# Patient Record
Sex: FEMALE | Race: OTHER
[De-identification: names, ages, dates, MRNs, and addresses within clinical notes are randomized per-mention and may not be internally consistent; named-entity substitution may affect disease eponyms.]

---

## 2018-05-30 ENCOUNTER — HOSPITAL ENCOUNTER (INPATIENT)
Dept: HOSPITAL 72 - EMR | Age: 28
LOS: 2 days | Discharge: HOME HEALTH | DRG: 552 | End: 2018-06-01
Payer: SELF-PAY

## 2018-05-30 VITALS — DIASTOLIC BLOOD PRESSURE: 76 MMHG | SYSTOLIC BLOOD PRESSURE: 132 MMHG

## 2018-05-30 VITALS — WEIGHT: 198 LBS | HEIGHT: 61 IN | BODY MASS INDEX: 37.38 KG/M2

## 2018-05-30 VITALS — DIASTOLIC BLOOD PRESSURE: 70 MMHG | SYSTOLIC BLOOD PRESSURE: 127 MMHG

## 2018-05-30 VITALS — SYSTOLIC BLOOD PRESSURE: 128 MMHG | DIASTOLIC BLOOD PRESSURE: 80 MMHG

## 2018-05-30 VITALS — DIASTOLIC BLOOD PRESSURE: 83 MMHG | SYSTOLIC BLOOD PRESSURE: 138 MMHG

## 2018-05-30 DIAGNOSIS — M51.26: Primary | ICD-10-CM

## 2018-05-30 DIAGNOSIS — R74.0: ICD-10-CM

## 2018-05-30 DIAGNOSIS — M43.06: ICD-10-CM

## 2018-05-30 LAB
ADD MANUAL DIFF: NO
ALBUMIN SERPL-MCNC: 3.8 G/DL (ref 3.4–5)
ALBUMIN/GLOB SERPL: 1 {RATIO} (ref 1–2.7)
ALP SERPL-CCNC: 105 U/L (ref 46–116)
ALT SERPL-CCNC: 107 U/L (ref 12–78)
ANION GAP SERPL CALC-SCNC: 10 MMOL/L (ref 5–15)
APPEARANCE UR: CLEAR
APTT PPP: (no result) S
AST SERPL-CCNC: 159 U/L (ref 15–37)
BASOPHILS NFR BLD AUTO: 0.7 % (ref 0–2)
BILIRUB SERPL-MCNC: 0.4 MG/DL (ref 0.2–1)
BUN SERPL-MCNC: 11 MG/DL (ref 7–18)
CALCIUM SERPL-MCNC: 8.7 MG/DL (ref 8.5–10.1)
CHLORIDE SERPL-SCNC: 106 MMOL/L (ref 98–107)
CO2 SERPL-SCNC: 24 MMOL/L (ref 21–32)
CREAT SERPL-MCNC: 0.7 MG/DL (ref 0.55–1.3)
EOSINOPHIL NFR BLD AUTO: 1.1 % (ref 0–3)
ERYTHROCYTE [DISTWIDTH] IN BLOOD BY AUTOMATED COUNT: 10.9 % (ref 11.6–14.8)
GLOBULIN SER-MCNC: 3.7 G/DL
GLUCOSE UR STRIP-MCNC: NEGATIVE MG/DL
HCT VFR BLD CALC: 39.6 % (ref 37–47)
HGB BLD-MCNC: 14 G/DL (ref 12–16)
KETONES UR QL STRIP: NEGATIVE
LEUKOCYTE ESTERASE UR QL STRIP: NEGATIVE
LYMPHOCYTES NFR BLD AUTO: 18.3 % (ref 20–45)
MCV RBC AUTO: 90 FL (ref 80–99)
MONOCYTES NFR BLD AUTO: 4.8 % (ref 1–10)
NEUTROPHILS NFR BLD AUTO: 75.1 % (ref 45–75)
NITRITE UR QL STRIP: NEGATIVE
PH UR STRIP: 7 [PH] (ref 4.5–8)
PLATELET # BLD: 281 K/UL (ref 150–450)
POTASSIUM SERPL-SCNC: 3.7 MMOL/L (ref 3.5–5.1)
PROT UR QL STRIP: NEGATIVE
RBC # BLD AUTO: 4.39 M/UL (ref 4.2–5.4)
SODIUM SERPL-SCNC: 140 MMOL/L (ref 136–145)
SP GR UR STRIP: 1 (ref 1–1.03)
UROBILINOGEN UR-MCNC: NORMAL MG/DL (ref 0–1)
WBC # BLD AUTO: 10.5 K/UL (ref 4.8–10.8)

## 2018-05-30 PROCEDURE — 36415 COLL VENOUS BLD VENIPUNCTURE: CPT

## 2018-05-30 PROCEDURE — 80329 ANALGESICS NON-OPIOID 1 OR 2: CPT

## 2018-05-30 PROCEDURE — 81003 URINALYSIS AUTO W/O SCOPE: CPT

## 2018-05-30 PROCEDURE — 87340 HEPATITIS B SURFACE AG IA: CPT

## 2018-05-30 PROCEDURE — 99284 EMERGENCY DEPT VISIT MOD MDM: CPT

## 2018-05-30 PROCEDURE — 85025 COMPLETE CBC W/AUTO DIFF WBC: CPT

## 2018-05-30 PROCEDURE — 81025 URINE PREGNANCY TEST: CPT

## 2018-05-30 PROCEDURE — 80048 BASIC METABOLIC PNL TOTAL CA: CPT

## 2018-05-30 PROCEDURE — 72148 MRI LUMBAR SPINE W/O DYE: CPT

## 2018-05-30 PROCEDURE — 86705 HEP B CORE ANTIBODY IGM: CPT

## 2018-05-30 PROCEDURE — 86803 HEPATITIS C AB TEST: CPT

## 2018-05-30 PROCEDURE — 72131 CT LUMBAR SPINE W/O DYE: CPT

## 2018-05-30 PROCEDURE — 86709 HEPATITIS A IGM ANTIBODY: CPT

## 2018-05-30 PROCEDURE — 80053 COMPREHEN METABOLIC PANEL: CPT

## 2018-05-30 NOTE — EMERGENCY ROOM REPORT
History of Present Illness


General


Chief Complaint:  Lower Back Pain or Injury


Source:  Patient, EMS





Present Illness


HPI


28-year-old female presents emergency department complaining of 10 out of 10 in 

severity acute onset sharp shooting lower back pain when she sat down to use 

the restroom.  Patient states that for the prior 2 days she has been having 

dull low back pain but no acute exacerbations of her pain similar in character 

to what she is experiencing right now.  Patient also reports urinary urgency, 

denies hematuria she denies fevers or chills, nausea or vomiting, constipation/

diarrhea or abdominal tenderness. Pain is currently exacerbated with any slight 

movement.  She denies history of sciatica she states that she has 

intermittently had low back pain before but it was always very mild in nature 

and easily relieved with over-the-counter pain medications.  Patient denies 

trauma or fall. Denies numbness tingling or loss of sensation or gross motor 

movements of the extremities, incontinence of bowel or bladder. Denies CP, 

Palpitations, LOC, AMS, dizziness, Changes in Vision, paresthesias, or a sudden 

severe headache.


Allergies:  


Coded Allergies:  


     No Known Allergies (Verified , 5/22/08)





Patient History


Past Medical History:  see triage record


Past Surgical History:  none


Pertinent Family History:  none


Last Menstrual Period:  05/28/2018


Pregnant Now:  No


Reviewed Nursing Documentation:  PMH: Agreed; PSxH: Agreed





Nursing Documentation-PMH


Past Medical History:  No Stated History





Review of Systems


All Other Systems:  negative except mentioned in HPI





Physical Exam





Vital Signs








  Date Time  Temp Pulse Resp B/P (MAP) Pulse Ox O2 Delivery O2 Flow Rate FiO2


 


5/30/18 17:13 97.8 85 19 138/83 99 Room Air  





 97.9       








Sp02 EP Interpretation:  reviewed, normal


General Appearance:  alert, GCS 15, non-toxic, moderate distress


Head:  normocephalic, atraumatic


ENT:  hearing grossly normal, normal voice


Neck:  full range of motion


Respiratory:  chest non-tender, lungs clear, normal breath sounds, speaking 

full sentences


Cardiovascular #1:  regular rate, rhythm, no edema, normal capillary refill


Cardiovascular #2:  2+ dorsalis pedis (R), 2+ dorsalis pedis (L)


Gastrointestinal:  normal bowel sounds, non tender, soft


Rectal:  deferred


Genitourinary:  normal inspection


Musculoskeletal:  back normal, other - Unable to ambulate due to pain, pt. 

cannot perform straight leg raise.  significant pain with the slightest of 

movements. , tender - moderate TTP across the lumbar area both paraspinal and 

midline.


Neurologic:  alert, oriented x3, responsive, sensory intact, speech normal, 

other - evaluation limited due to pt. having pain, grossly normal


Psychiatric:  judgement/insight normal


Skin:  normal color, no rash, warm/dry, well hydrated





Medical Decision Making


PA Attestation


Dr. garcia is my supervising Physician whom patient management has been 

discussed with.


Diagnostic Impression:  


 Primary Impression:  


 Acute low back pain


 Qualified Codes:  M54.5 - Low back pain


 Additional Impression:  


 Spondylolysis of lumbar region


ER Course


28-year-old female presents emergency department complaining of 10 out of 10 in 

severity acute onset sharp shooting lower back pain when she sat down to use 

the restroom.  Patient states that for the prior 2 days she has been having 

dull low back pain but no acute exacerbations of her pain similar in character 

to what she is experiencing right now.  Patient also reports urinary urgency, 

denies hematuria she denies fevers or chills, nausea or vomiting, constipation/

diarrhea or abdominal tenderness. Pain is currently exacerbated with any slight 

movement.  She denies history of sciatica she states that she has 

intermittently had low back pain before but it was always very mild in nature 

and easily relieved with over-the-counter pain medications.  Patient denies 

trauma or fall. Denies numbness tingling or loss of sensation or gross motor 

movements of the extremities, incontinence of bowel or bladder. Denies CP, 

Palpitations, LOC, AMS, dizziness, Changes in Vision, paresthesias, or a sudden 

severe headache. 





Ddx considered but are not limited to Sciatica, muscle strain/spasm, 

Pyelonephritis, UTI, kidney stone, herniated disc just to name a few. 





Vital signs: are WNL, pt. is afebrile





H&PE are most consistent with possible sciatic nerve impingement/muscle spasm/

strain. I do not suspect spinal chord injury, no evidence of cauda equina.  





ORDERS: 


-CBC,CMB, all WNL  except for elevated AST/ALT


-UA: unremarkable


-Urine Hcg: negative





-CT L-Spine No Contrast








ED INTERVENTIONS: 





-Morphine IM


-Toradol IM


-Ativan 1 mg IV


-Morphine IV








DISPOSITION: at this time pt. will be admitted to Dr. Suero  for Intractable 

back pain.


Dr. Suero agreed to admit the pt. and to continue pt. care management.





Labs








Test


  5/30/18


18:10


 


Urine Color Pale yellow 


 


Urine Appearance Clear 


 


Urine pH 7 (4.5-8.0) 


 


Urine Specific Gravity


  1.005


(1.005-1.035)


 


Urine Protein


  Negative


(NEGATIVE)


 


Urine Glucose (UA)


  Negative


(NEGATIVE)


 


Urine Ketones


  Negative


(NEGATIVE)


 


Urine Occult Blood


  Negative


(NEGATIVE)


 


Urine Nitrite


  Negative


(NEGATIVE)


 


Urine Bilirubin


  Negative


(NEGATIVE)


 


Urine Urobilinogen


  Normal MG/DL


(0.0-1.0)


 


Urine Leukocyte Esterase


  Negative


(NEGATIVE)


 


Urine HCG, Qualitative


  Negative


(NEGATIVE)








CT/MRI/US Diagnostic Results


CT/MRI/US Diagnostic Results :  


   Imaging Test Ordered:  CT L-Spine No Contrast


   Impression


"No fracture or malalignment.  Incidental spondylolysis at L5.  Partially 

visualized cystic structure in the pelvis could be extended bladder or other 

cystic lesion." Per official radiology report- Please see report for specific 

details.





Last Vital Signs








  Date Time  Temp Pulse Resp B/P (MAP) Pulse Ox O2 Delivery O2 Flow Rate FiO2


 


5/30/18 17:13 97.8 85 19 138/83 99 Room Air  





 97.9       








Disposition:  ADMITTED AS INPATIENT


Condition:  Esha Jain May 30, 2018 17:28

## 2018-05-31 VITALS — DIASTOLIC BLOOD PRESSURE: 76 MMHG | SYSTOLIC BLOOD PRESSURE: 129 MMHG

## 2018-05-31 VITALS — DIASTOLIC BLOOD PRESSURE: 83 MMHG | SYSTOLIC BLOOD PRESSURE: 136 MMHG

## 2018-05-31 VITALS — DIASTOLIC BLOOD PRESSURE: 82 MMHG | SYSTOLIC BLOOD PRESSURE: 140 MMHG

## 2018-05-31 VITALS — DIASTOLIC BLOOD PRESSURE: 74 MMHG | SYSTOLIC BLOOD PRESSURE: 130 MMHG

## 2018-05-31 VITALS — SYSTOLIC BLOOD PRESSURE: 122 MMHG | DIASTOLIC BLOOD PRESSURE: 73 MMHG

## 2018-05-31 VITALS — DIASTOLIC BLOOD PRESSURE: 79 MMHG | SYSTOLIC BLOOD PRESSURE: 122 MMHG

## 2018-05-31 LAB
ADD MANUAL DIFF: NO
ANION GAP SERPL CALC-SCNC: 9 MMOL/L (ref 5–15)
BASOPHILS NFR BLD AUTO: 0.6 % (ref 0–2)
BUN SERPL-MCNC: 10 MG/DL (ref 7–18)
CALCIUM SERPL-MCNC: 8.7 MG/DL (ref 8.5–10.1)
CHLORIDE SERPL-SCNC: 103 MMOL/L (ref 98–107)
CO2 SERPL-SCNC: 27 MMOL/L (ref 21–32)
CREAT SERPL-MCNC: 0.6 MG/DL (ref 0.55–1.3)
EOSINOPHIL NFR BLD AUTO: 1.3 % (ref 0–3)
ERYTHROCYTE [DISTWIDTH] IN BLOOD BY AUTOMATED COUNT: 11.3 % (ref 11.6–14.8)
HCT VFR BLD CALC: 39.6 % (ref 37–47)
HGB BLD-MCNC: 13.7 G/DL (ref 12–16)
LYMPHOCYTES NFR BLD AUTO: 20.6 % (ref 20–45)
MCV RBC AUTO: 93 FL (ref 80–99)
MONOCYTES NFR BLD AUTO: 5.4 % (ref 1–10)
NEUTROPHILS NFR BLD AUTO: 72.2 % (ref 45–75)
PLATELET # BLD: 258 K/UL (ref 150–450)
POTASSIUM SERPL-SCNC: 3.9 MMOL/L (ref 3.5–5.1)
RBC # BLD AUTO: 4.23 M/UL (ref 4.2–5.4)
SODIUM SERPL-SCNC: 139 MMOL/L (ref 136–145)
WBC # BLD AUTO: 7 K/UL (ref 4.8–10.8)

## 2018-05-31 RX ADMIN — MORPHINE SULFATE PRN MG: 4 INJECTION INTRAVENOUS at 04:18

## 2018-05-31 RX ADMIN — METHOCARBAMOL SCH MG: 500 TABLET, FILM COATED ORAL at 20:58

## 2018-05-31 RX ADMIN — MORPHINE SULFATE PRN MG: 4 INJECTION INTRAVENOUS at 19:43

## 2018-05-31 RX ADMIN — METHOCARBAMOL SCH MG: 500 TABLET, FILM COATED ORAL at 17:29

## 2018-05-31 RX ADMIN — MORPHINE SULFATE PRN MG: 4 INJECTION INTRAVENOUS at 22:09

## 2018-05-31 RX ADMIN — METHOCARBAMOL SCH MG: 500 TABLET, FILM COATED ORAL at 13:14

## 2018-05-31 RX ADMIN — MORPHINE SULFATE PRN MG: 4 INJECTION INTRAVENOUS at 00:16

## 2018-05-31 NOTE — DIAGNOSTIC IMAGING REPORT
Indication: Back pain

 

Technique: Continuous helical transaxial imaging of the lumbar spine was obtained

from the lung bases to the pubic symphysis.  No IV contrast was administered. 

Coronal 2-D reformats were also obtained. Study obtained in a Siemens sensation 64

slice CT. 

 

Total Dose length Product (DLP):  474.75 mGycm

 

CT Dose Index Volume (CTDIvol):   19.13 mGy

 

 

Comparison: None

 

Findings: There is no evidence of an acute fracture or malalignment. Height and

configuration of the vertebral bodies and intervertebral discs are within normal

limits. The facets are unremarkable. There is no soft tissue swelling.

 

 

Impression: Negative lumbar spine CT

 

 

 

 

The CT scanner at [facility] is accredited by the [employer] College of

Radiology and the scans are performed using dose optimization techniques as

appropriate to a performed exam including Automatic Exposure control.

## 2018-05-31 NOTE — HISTORY AND PHYSICAL
History of Present Illness


General


Date patient seen:  May 31, 2018


Time patient seen:  12:35


Reason for Hospitalization:  Lower Back Pain or Injury





Present Illness


HPI


27 y/o female with no significant past medical history presented to the ED for 

severe low back pain. Patient states that she has been having progressively 

worsening low back pain for the last several days. Patient states that last 

night, patient had a sudden onset of acute low back pain while she was in the 

bathroom and states that she has not been able to move. She was brought to the 

ER and CT L spine was done, which was unremarkable. UA was also unremarkable 

with no hematuria. Denies chest pain, sob, f/c, n/v, abdominal pain. Denies any 

trauma to the area or heavy lifting. Denies IV drug use or other illicit drug 

use. Denies sensory or motor deficits. Denies urinary/bowel incontinence. 

Family at bedside.


Allergies:  


Coded Allergies:  


     No Known Allergies (Verified , 5/22/08)





Medication History


Scheduled


No Known Medications* (NKM - No Known Medications*), 0 ., (Reported)





Patient History


History Provided By:  Patient, Family Member


Healthcare decision maker


N


Resuscitation status





Advanced Directive on File








Review of Systems


All Other Systems:  negative except mentioned in HPI





Physical Exam


General Appearance:  no apparent distress, alert


HEENT:  normocephalic, atraumatic


Neck:  non-tender, normal alignment, supple


Respiratory/Chest:  chest wall non-tender, lungs clear, normal breath sounds


Cardiovascular/Chest:  normal peripheral pulses, normal rate, regular rhythm


Abdomen:  normal bowel sounds, non tender, soft


Neurologic:  alert, oriented x 3, other - unable to perform straight leg test 

due to back pain with movement of lower extremities. mild tenderness upon 

palpation to low back. no sensory deficit.





Last 24 Hour Vital Signs








  Date Time  Temp Pulse Resp B/P (MAP) Pulse Ox O2 Delivery O2 Flow Rate FiO2


 


5/31/18 11:54 97.5 79 20 122/79 95   





 97.5       


 


5/31/18 08:30 98.1 78 20 129/76 95   





 98.1       


 


5/31/18 04:00 97.1 77 18 136/83 97 Room Air  





 97.1       


 


5/31/18 00:00 97.8 79 18 130/74 99 Room Air  





 97.8       


 


5/30/18 22:58 97.8 78 18 128/80 98 Room Air  





 97.8       


 


5/30/18 22:45 98.1 84 20 132/76 97 Room Air  





 98.1       


 


5/30/18 22:26  78 18 128/80 98 Room Air  


 


5/30/18 22:24 97.8       


 


5/30/18 21:57 97.8       


 


5/30/18 19:30 98.0 82 18 127/70 98 Room Air  





 98.0       


 


5/30/18 18:51 97.8       


 


5/30/18 18:51 97.8       


 


5/30/18 17:37  85 19 138/83 99 Room Air  


 


5/30/18 17:29 97.8       


 


5/30/18 17:29 97.8       


 


5/30/18 17:13 97.8 85 19 138/83 99 Room Air  





 97.9       

















Intake and Output  


 


 5/30/18 5/31/18





 19:00 07:00


 


Intake Total  100 ml


 


Output Total 700 ml 800 ml


 


Balance -700 ml -700 ml


 


  


 


Intake Oral  100 ml


 


Output Urine Total 700 ml 800 ml











Laboratory Tests








Test


  5/30/18


18:10 5/30/18


21:15 5/31/18


06:52


 


Urine Color Pale yellow    


 


Urine Appearance Clear    


 


Urine pH 7 (4.5-8.0)    


 


Urine Specific Gravity


  1.005


(1.005-1.035) 


  


 


 


Urine Protein


  Negative


(NEGATIVE) 


  


 


 


Urine Glucose (UA)


  Negative


(NEGATIVE) 


  


 


 


Urine Ketones


  Negative


(NEGATIVE) 


  


 


 


Urine Occult Blood


  Negative


(NEGATIVE) 


  


 


 


Urine Nitrite


  Negative


(NEGATIVE) 


  


 


 


Urine Bilirubin


  Negative


(NEGATIVE) 


  


 


 


Urine Urobilinogen


  Normal MG/DL


(0.0-1.0) 


  


 


 


Urine Leukocyte Esterase


  Negative


(NEGATIVE) 


  


 


 


Urine HCG, Qualitative


  Negative


(NEGATIVE) 


  


 


 


White Blood Count


  


  10.5 K/UL


(4.8-10.8) 7.0 K/UL


(4.8-10.8)


 


Red Blood Count


  


  4.39 M/UL


(4.20-5.40) 4.23 M/UL


(4.20-5.40)


 


Hemoglobin


  


  14.0 G/DL


(12.0-16.0) 13.7 G/DL


(12.0-16.0)


 


Hematocrit


  


  39.6 %


(37.0-47.0) 39.6 %


(37.0-47.0)


 


Mean Corpuscular Volume  90 FL (80-99)   93 FL (80-99)  


 


Mean Corpuscular Hemoglobin


  


  31.9 PG


(27.0-31.0)  H 32.4 PG


(27.0-31.0)  H


 


Mean Corpuscular Hemoglobin


Concent 


  35.3 G/DL


(32.0-36.0) 34.7 G/DL


(32.0-36.0)


 


Red Cell Distribution Width


  


  10.9 %


(11.6-14.8)  L 11.3 %


(11.6-14.8)  L


 


Platelet Count


  


  281 K/UL


(150-450) 258 K/UL


(150-450)


 


Mean Platelet Volume


  


  7.6 FL


(6.5-10.1) 7.8 FL


(6.5-10.1)


 


Neutrophils (%) (Auto)


  


  75.1 %


(45.0-75.0)  H 72.2 %


(45.0-75.0)


 


Lymphocytes (%) (Auto)


  


  18.3 %


(20.0-45.0)  L 20.6 %


(20.0-45.0)


 


Monocytes (%) (Auto)


  


  4.8 %


(1.0-10.0) 5.4 %


(1.0-10.0)


 


Eosinophils (%) (Auto)


  


  1.1 %


(0.0-3.0) 1.3 %


(0.0-3.0)


 


Basophils (%) (Auto)


  


  0.7 %


(0.0-2.0) 0.6 %


(0.0-2.0)


 


Sodium Level


  


  140 MMOL/L


(136-145) 139 MMOL/L


(136-145)


 


Potassium Level


  


  3.7 MMOL/L


(3.5-5.1) 3.9 MMOL/L


(3.5-5.1)


 


Chloride Level


  


  106 MMOL/L


() 103 MMOL/L


()


 


Carbon Dioxide Level


  


  24 MMOL/L


(21-32) 27 MMOL/L


(21-32)


 


Anion Gap


  


  10 mmol/L


(5-15) 9 mmol/L


(5-15)


 


Blood Urea Nitrogen


  


  11 mg/dL


(7-18) 10 mg/dL


(7-18)


 


Creatinine


  


  0.7 MG/DL


(0.55-1.30) 0.6 MG/DL


(0.55-1.30)


 


Estimat Glomerular Filtration


Rate 


  > 60 mL/min


(>60) > 60 mL/min


(>60)


 


Glucose Level


  


  116 MG/DL


()  H 99 MG/DL


()


 


Calcium Level


  


  8.7 MG/DL


(8.5-10.1) 8.7 MG/DL


(8.5-10.1)


 


Total Bilirubin


  


  0.4 MG/DL


(0.2-1.0) 


 


 


Aspartate Amino Transf


(AST/SGOT) 


  159 U/L


(15-37)  H 


 


 


Alanine Aminotransferase


(ALT/SGPT) 


  107 U/L


(12-78)  H 


 


 


Alkaline Phosphatase


  


  105 U/L


() 


 


 


Total Protein


  


  7.5 G/DL


(6.4-8.2) 


 


 


Albumin


  


  3.8 G/DL


(3.4-5.0) 


 


 


Globulin  3.7 g/dL   


 


Albumin/Globulin Ratio  1.0 (1.0-2.7)   








Height (Feet):  5


Height (Inches):  1.00


Weight (Pounds):  198


Medications





Current Medications








 Medications


  (Trade)  Dose


 Ordered  Sig/Santos


 Route


 PRN Reason  Start Time


 Stop Time Status Last Admin


Dose Admin


 


 Gabapentin


  (Neurontin)  100 mg  THREE TIMES A  DAY


 ORAL


   5/30/18 23:49


 6/29/18 23:48  5/31/18 08:32


 


 


 Ibuprofen


  (Motrin)  600 mg  Q6H  PRN


 ORAL


 For Pain  5/30/18 23:45


 6/29/18 23:44   


 


 


 Lidocaine


  (Lidoderm 5%


 PATCH)  1 patch  DAILY


 TDERMAL


   5/31/18 09:00


 6/30/18 08:59  5/31/18 08:32


 


 


 Morphine Sulfate


  (Morphine


 Sulfate)  2 mg  EVERY 2 HOURS  PRN


 IVP


 Breakthrough Pain  5/30/18 23:45


 6/6/18 23:44   


 


 


 Morphine Sulfate


  (Morphine


 Sulfate)  4 mg  Q4H  PRN


 IVP


 Moderate Pain (Pain Scale 4-6)  5/30/18 23:45


 6/6/18 23:44   


 


 


 Morphine Sulfate


  (Morphine


 Sulfate)  6 mg  Q4H  PRN


 IVP


 Severe Pain (Pain Scale 7-10)  5/30/18 23:45


 6/6/18 23:44  5/31/18 04:18


 


 


 Ondansetron HCl


  (Zofran)  4 mg  EVERY 4 HOURS  PRN


 IVP


 Nausea & Vomiting  5/30/18 23:45


 6/29/18 23:44   


 











Assessment/Plan


Problem List:  


(1) Acute low back pain


ICD Codes:  M54.5 - Low back pain


SNOMED:  452893001


Qualifiers:  


   Qualified Codes:  M54.5 - Low back pain


(2) Intractable back pain


ICD Codes:  M54.9 - Dorsalgia, unspecified


SNOMED:  553327472


(3) Transaminitis


ICD Codes:  R74.0 - Nonspecific elevation of levels of transaminase and lactic 

acid dehydrogenase [LDH]


SNOMED:  531257608, [id number]


Status:  not improved


Assessment/Plan


- Admit to inpatient


- CT L spine negative 


- f/u MRI L spine


- Robaxin 1000mg QID 


- prednisone 60mg qd 


- IV morphine for breakthrough pain 


- UA unremarkable. low suspicion for nephrolithiasis 


- trend LFTs. check ETOH, acetaminophen level, hepatitis panel 





DVT ppx: HSQ


Full Code





Disposition: Home with home health 





I spent a total of 71 minutes on this patient's case with greater than 50% 

spent on care and coordination of this patient's case. Case was d/w patient, 

family, nursing staff, and Dr. Suero.











Anahi Leong NP May 31, 2018 12:35

## 2018-05-31 NOTE — DIAGNOSTIC IMAGING REPORT
Indication: Back pain

 

Technique: MRI examination of the Lumbar spine was performed in a 1.5 Nadine magnet.

Sequences obtained include sagittal and axial T1 and T2 fast spin echo, and sagittal

STIR. 

 

Comparison: none

 

Findings: There is a 5 mm disc protrusion at L4-5 abutting the anterior aspect of the

thecal sac. There is no visualized nerve root impingement. No evidence of central

canal or neural foraminal stenosis. There is mild desiccation of the L4-5

intervertebral disc which may be slightly narrowed as well. Bone marrow signal is

normal. Alignment is normal. The visualized part of the distal spinal cord appears

normal. The cord terminates at about T12. There is no soft tissue swelling.

 

IMPRESSION:

 

Small disc protrusion noted at L4-5.

## 2018-06-01 VITALS — SYSTOLIC BLOOD PRESSURE: 127 MMHG | DIASTOLIC BLOOD PRESSURE: 80 MMHG

## 2018-06-01 VITALS — SYSTOLIC BLOOD PRESSURE: 115 MMHG | DIASTOLIC BLOOD PRESSURE: 68 MMHG

## 2018-06-01 VITALS — SYSTOLIC BLOOD PRESSURE: 109 MMHG | DIASTOLIC BLOOD PRESSURE: 69 MMHG

## 2018-06-01 VITALS — DIASTOLIC BLOOD PRESSURE: 73 MMHG | SYSTOLIC BLOOD PRESSURE: 134 MMHG

## 2018-06-01 VITALS — SYSTOLIC BLOOD PRESSURE: 119 MMHG | DIASTOLIC BLOOD PRESSURE: 86 MMHG

## 2018-06-01 LAB
ADD MANUAL DIFF: NO
ALBUMIN SERPL-MCNC: 3.8 G/DL (ref 3.4–5)
ALBUMIN/GLOB SERPL: 0.9 {RATIO} (ref 1–2.7)
ALP SERPL-CCNC: 105 U/L (ref 46–116)
ALT SERPL-CCNC: 352 U/L (ref 12–78)
ANION GAP SERPL CALC-SCNC: 7 MMOL/L (ref 5–15)
AST SERPL-CCNC: 82 U/L (ref 15–37)
BASOPHILS NFR BLD AUTO: 0.7 % (ref 0–2)
BILIRUB SERPL-MCNC: 0.5 MG/DL (ref 0.2–1)
BUN SERPL-MCNC: 8 MG/DL (ref 7–18)
CALCIUM SERPL-MCNC: 8.9 MG/DL (ref 8.5–10.1)
CHLORIDE SERPL-SCNC: 103 MMOL/L (ref 98–107)
CO2 SERPL-SCNC: 29 MMOL/L (ref 21–32)
CREAT SERPL-MCNC: 0.6 MG/DL (ref 0.55–1.3)
EOSINOPHIL NFR BLD AUTO: 1.2 % (ref 0–3)
ERYTHROCYTE [DISTWIDTH] IN BLOOD BY AUTOMATED COUNT: 11 % (ref 11.6–14.8)
GLOBULIN SER-MCNC: 4.1 G/DL
HCT VFR BLD CALC: 41.6 % (ref 37–47)
HGB BLD-MCNC: 14.7 G/DL (ref 12–16)
LYMPHOCYTES NFR BLD AUTO: 20.2 % (ref 20–45)
MCV RBC AUTO: 93 FL (ref 80–99)
MONOCYTES NFR BLD AUTO: 5.4 % (ref 1–10)
NEUTROPHILS NFR BLD AUTO: 72.6 % (ref 45–75)
PLATELET # BLD: 272 K/UL (ref 150–450)
POTASSIUM SERPL-SCNC: 3.7 MMOL/L (ref 3.5–5.1)
RBC # BLD AUTO: 4.48 M/UL (ref 4.2–5.4)
SODIUM SERPL-SCNC: 139 MMOL/L (ref 136–145)
WBC # BLD AUTO: 10.6 K/UL (ref 4.8–10.8)

## 2018-06-01 RX ADMIN — MORPHINE SULFATE PRN MG: 4 INJECTION INTRAVENOUS at 13:08

## 2018-06-01 RX ADMIN — MORPHINE SULFATE PRN MG: 4 INJECTION INTRAVENOUS at 08:40

## 2018-06-01 RX ADMIN — METHOCARBAMOL SCH MG: 500 TABLET, FILM COATED ORAL at 14:21

## 2018-06-01 RX ADMIN — METHOCARBAMOL SCH MG: 500 TABLET, FILM COATED ORAL at 08:37

## 2018-06-01 RX ADMIN — POLYETHYLENE GLYCOL 3350 SCH GM: 17 POWDER, FOR SOLUTION ORAL at 08:37

## 2018-06-01 RX ADMIN — MORPHINE SULFATE PRN MG: 4 INJECTION INTRAVENOUS at 02:52

## 2018-06-01 RX ADMIN — MORPHINE SULFATE PRN MG: 4 INJECTION INTRAVENOUS at 00:09

## 2018-06-01 RX ADMIN — POLYETHYLENE GLYCOL 3350 SCH GM: 17 POWDER, FOR SOLUTION ORAL at 00:37

## 2018-06-01 RX ADMIN — MORPHINE SULFATE PRN MG: 4 INJECTION INTRAVENOUS at 04:39

## 2018-06-07 NOTE — DISCHARGE SUMMARY
Discharge Summary


Hospital Course


Date of Admission


May 30, 2018 at 20:20


Date of Discharge


Jun 1, 2018 at 17:59


Admitting Diagnosis


 intractable pain


HPI


Ashley Coyne is a 28 year old female who was admitted on May 30, 2018 at 20:

20 for Intractable Pain


27 y/o female with no significant past medical history presented to the ED for 

severe low back pain. Patient states that she has been having progressively 

worsening low back pain for the last several days. Patient states that last 

night, patient had a sudden onset of acute low back pain while she was in the 

bathroom and states that she has not been able to move. She was brought to the 

ER and CT L spine was done, which was unremarkable. UA was also unremarkable 

with no hematuria. Denies chest pain, sob, f/c, n/v, abdominal pain. Denies any 

trauma to the area or heavy lifting. Denies IV drug use or other illicit drug 

use. Denies sensory or motor deficits. Denies urinary/bowel incontinence. 

Family at bedside.


Consultations


none


Procedures


none


Hospital Course


Patient was further admitted for pain control. CT L spine was negative. MRI L 

spine showed small disc protrusion at L4-5. Patient was started on prednisone 

and robaxin. Patient's pain improved and patient was able to ambulate well with 

PT with a walker. Patient was also noted to have elevated LFTs, which 

downtrended. ETOH and acetaminophen levels were wnl. Patient was further 

advised to f/u with PMD to repeat CMP  and further workup for transaminitis 

within 2-3 days. Patient was stable for discharge and was discharged to home 

with home health. 








Physical Exam


General Appearance:  no apparent distress, alert


HEENT:  normocephalic, atraumatic


Neck:  non-tender, normal alignment, supple


Respiratory/Chest:  chest wall non-tender, lungs clear, normal breath sounds


Cardiovascular/Chest:  normal peripheral pulses, normal rate, regular rhythm


Abdomen:  normal bowel sounds, non tender, soft


Neurologic:  alert, oriented x 3, other - unable to perform straight leg test 

due to back pain with movement of lower extremities. mild tenderness upon 

palpation to low back. no sensory deficit.





Discharge Medications


New Medications:  


Methylprednisolone (Medrol) 4 Mg Tab.ds.pk


4 MG PO DAILY, #1 PACK





Methocarbamol* (Methocarbamol*) 500 Mg Tablet


1000 MG ORAL QID for 15 Days, #60 TAB











Discharge


Condition Upon Discharge:  improving, stable


Discharge Disposition


Patient was discharged to Home with Home Health([id number])


Discharge Diagnoses:  


(1) Protrusion of lumbar intervertebral disc


(2) Transaminitis


(3) Intractable back pain











Anahi Leong NP Jun 7, 2018 07:14

## 2024-04-03 ENCOUNTER — HOSPITAL ENCOUNTER (EMERGENCY)
Dept: HOSPITAL 54 - ER | Age: 34
Discharge: HOME | End: 2024-04-03
Payer: COMMERCIAL

## 2024-04-03 VITALS — SYSTOLIC BLOOD PRESSURE: 161 MMHG | DIASTOLIC BLOOD PRESSURE: 118 MMHG | TEMPERATURE: 98.2 F | OXYGEN SATURATION: 98 %

## 2024-04-03 VITALS — BODY MASS INDEX: 40.48 KG/M2 | HEIGHT: 62 IN | WEIGHT: 220 LBS

## 2024-04-03 DIAGNOSIS — G43.909: Primary | ICD-10-CM

## 2024-04-03 DIAGNOSIS — I10: ICD-10-CM

## 2024-04-03 PROCEDURE — 96372 THER/PROPH/DIAG INJ SC/IM: CPT

## 2024-04-03 PROCEDURE — 99284 EMERGENCY DEPT VISIT MOD MDM: CPT

## 2024-04-03 PROCEDURE — 96374 THER/PROPH/DIAG INJ IV PUSH: CPT

## 2024-04-03 PROCEDURE — 96375 TX/PRO/DX INJ NEW DRUG ADDON: CPT

## 2024-04-03 PROCEDURE — 96361 HYDRATE IV INFUSION ADD-ON: CPT

## 2024-04-03 RX ADMIN — SUMATRIPTAN SUCCINATE ONE MG: 6 INJECTION SUBCUTANEOUS at 17:41

## 2024-04-03 RX ADMIN — SODIUM CHLORIDE ONE MG: 9 INJECTION, SOLUTION INTRAVENOUS at 17:42

## 2024-04-03 RX ADMIN — KETOROLAC TROMETHAMINE ONE MG: 30 INJECTION, SOLUTION INTRAMUSCULAR at 17:45

## 2024-04-03 RX ADMIN — SODIUM CHLORIDE ONE ML: 9 INJECTION, SOLUTION INTRAVENOUS at 17:41
